# Patient Record
Sex: FEMALE | Race: WHITE | NOT HISPANIC OR LATINO
[De-identification: names, ages, dates, MRNs, and addresses within clinical notes are randomized per-mention and may not be internally consistent; named-entity substitution may affect disease eponyms.]

---

## 2023-05-16 PROBLEM — Z00.00 ENCOUNTER FOR PREVENTIVE HEALTH EXAMINATION: Status: ACTIVE | Noted: 2023-05-16

## 2023-11-27 ENCOUNTER — APPOINTMENT (OUTPATIENT)
Dept: ENDOCRINOLOGY | Facility: CLINIC | Age: 38
End: 2023-11-27

## 2024-01-03 ENCOUNTER — APPOINTMENT (OUTPATIENT)
Dept: BARIATRICS/WEIGHT MGMT | Facility: CLINIC | Age: 39
End: 2024-01-03
Payer: COMMERCIAL

## 2024-01-03 VITALS
HEIGHT: 67 IN | DIASTOLIC BLOOD PRESSURE: 80 MMHG | WEIGHT: 225 LBS | OXYGEN SATURATION: 98 % | HEART RATE: 82 BPM | BODY MASS INDEX: 35.31 KG/M2 | SYSTOLIC BLOOD PRESSURE: 130 MMHG

## 2024-01-03 DIAGNOSIS — Z82.49 FAMILY HISTORY OF ISCHEMIC HEART DISEASE AND OTHER DISEASES OF THE CIRCULATORY SYSTEM: ICD-10-CM

## 2024-01-03 DIAGNOSIS — Z82.3 FAMILY HISTORY OF STROKE: ICD-10-CM

## 2024-01-03 DIAGNOSIS — Z80.42 FAMILY HISTORY OF MALIGNANT NEOPLASM OF PROSTATE: ICD-10-CM

## 2024-01-03 PROCEDURE — 99204 OFFICE O/P NEW MOD 45 MIN: CPT

## 2024-01-03 RX ORDER — ATORVASTATIN CALCIUM 40 MG/1
40 TABLET, FILM COATED ORAL
Qty: 90 | Refills: 1 | Status: ACTIVE | COMMUNITY
Start: 2024-01-03

## 2024-01-03 RX ORDER — LOSARTAN POTASSIUM 25 MG/1
25 TABLET, FILM COATED ORAL DAILY
Qty: 1 | Refills: 2 | Status: ACTIVE | COMMUNITY
Start: 2024-01-03

## 2024-01-03 RX ORDER — CALCIUM CARBONATE 260MG(650)
100 TABLET,CHEWABLE ORAL
Refills: 0 | Status: ACTIVE | COMMUNITY
Start: 2024-01-03

## 2024-01-03 NOTE — HISTORY OF PRESENT ILLNESS
[FreeTextEntry1] : 38F PMH class II obesity, HTN, HLD, prediabetes, low HDL, anemia, who presents to weight management for initial evaluation.  Weight/Diet History: She has always struggled with her weight, was a 'chunky kid'. Lost some weight in college, and regained since. After pregnancy, the complex needs of her daughter (autism) started to gain weight as she wasn't taking care of herself. Her highest adult weight was 265 lbs (early 2023). She had been on Ozempic .25 mg/wk for 4 months, increased to 0.35 mg/wk, has lost 30-40 lbs over that time. Also walking more, weighing food, counting calories.  She has engaged in numerous weight loss interventions, including IF, aeroboic exercise, strength training, dietary tracking, without sustained effect.  Weight today: 225 lbs, BMI 35.24  Diet: Has been weighing and tracking everything, except the vegetables. B:  L: Turkey/chicken sandwich, with American cheese, spinach, red onion and greek-yogurt "chipotle wilkes". Side of popcorn, carrots, pickles, etc D (6:30-7): Hello Fresh - Usually chicken (1x/wk red meat), 700-900 kcal  Dessert: Snack: Sometimes a sweet or a fair life protein shake for sweet Beverages: 2x/month has 1-2 drinks. Occasional glass of milk.  Night-time eating: Sometimes on nights she has a deficit, but usually not. Stops eating by 8 whens he can Binging:  Fast-food/Restaurants: 1x/wk takeout Cook/Prepare meals: Bakes, sautee. Added oils: [employer]: yes, currently.  Exercise: [employer] spin class 3-4x/wk, and one 2-hr walk on treadmill per week while working.   Sleep: Goes to bed 10:30-11 pm, occasional snoring but gotten better, estimates 7-8 hours.   Social Hx: No tobacco use, occasional social alcohol. Has 6 year old daughter, w/craniosynostosis, autism.   Labs (10/27/23): CMP wnl, A1c 5.3%, Tchol 137, Trig 77, HDL 49, LDL 73, non-HDL 89, TSH/fT4 wnl,  CBC (4/3/23)

## 2024-01-03 NOTE — ASSESSMENT
[FreeTextEntry1] : 38F PMH class II obesity, HTN, HLD, prediabetes, low HDL, anemia, who presents to weight management for initial evaluation.  # Metabolic syndrome (class II obesity - central, HTN, prediabetes, low HDL) c/b HLD: Weight today 225 lbs, BMI 35.24, miranda has struggled with weight throughout her life. Particularly put on weight after pregnancy in the setting of taking care of the needs of her daughter. She took Ozempic 0.25 mg/wk for 4 months, increased to .35 mg/wk and has been doing very well, lost nearly 40 lbs, in conjunction with lifestyle changes including tracking her diet/calories, increasing exercise. Her diet is noted for 2 meals per day, tries not to eat late unless she has undereaten calorie goals that day. Doing [employer] several days per week. Sleep is good. Labs in October. Her goal is to eventually get off all medications.  - C/w tracking her diet - Meal planning/prep - Consider incorporating more plant-based proteins (ie legumes, unrefined whole grains) and keep vegetables, some fruit.  - C/w regular exercise - C/w current dosing of Ozempic, may go up to .5 mg/wk. Can consider looking into cost of Zepbound or alternatives with coupon, but for now will plan to stay with semaglutide.  - When dose increases, may prescribe higher dose pen - F/u in the next 3 months

## 2024-04-02 PROBLEM — E88.810 METABOLIC SYNDROME: Status: ACTIVE | Noted: 2024-01-03

## 2024-04-02 PROBLEM — E66.01 CLASS 2 SEVERE OBESITY WITH SERIOUS COMORBIDITY IN ADULT: Status: ACTIVE | Noted: 2024-01-03

## 2024-04-02 PROBLEM — R74.8 LOW SERUM HDL: Status: ACTIVE | Noted: 2024-01-03

## 2024-04-02 PROBLEM — I10 BENIGN ESSENTIAL HTN: Status: ACTIVE | Noted: 2024-01-03

## 2024-04-02 PROBLEM — E78.5 HLD (HYPERLIPIDEMIA): Status: ACTIVE | Noted: 2024-01-03

## 2024-04-03 ENCOUNTER — APPOINTMENT (OUTPATIENT)
Dept: BARIATRICS/WEIGHT MGMT | Facility: CLINIC | Age: 39
End: 2024-04-03
Payer: COMMERCIAL

## 2024-04-03 VITALS — HEIGHT: 67 IN | BODY MASS INDEX: 34.21 KG/M2 | WEIGHT: 218 LBS

## 2024-04-03 DIAGNOSIS — E66.01 MORBID (SEVERE) OBESITY DUE TO EXCESS CALORIES: ICD-10-CM

## 2024-04-03 DIAGNOSIS — E78.5 HYPERLIPIDEMIA, UNSPECIFIED: ICD-10-CM

## 2024-04-03 DIAGNOSIS — R74.8 ABNORMAL LEVELS OF OTHER SERUM ENZYMES: ICD-10-CM

## 2024-04-03 DIAGNOSIS — I10 ESSENTIAL (PRIMARY) HYPERTENSION: ICD-10-CM

## 2024-04-03 DIAGNOSIS — E88.810 METABOLIC SYNDROME: ICD-10-CM

## 2024-04-03 PROCEDURE — 99214 OFFICE O/P EST MOD 30 MIN: CPT

## 2024-04-03 PROCEDURE — G2211 COMPLEX E/M VISIT ADD ON: CPT | Mod: NC,1L

## 2024-04-03 RX ORDER — SEMAGLUTIDE 1.34 MG/ML
4 INJECTION, SOLUTION SUBCUTANEOUS
Qty: 1 | Refills: 0 | Status: ACTIVE | COMMUNITY
Start: 2024-01-03 | End: 1900-01-01

## 2024-04-04 NOTE — ASSESSMENT
[FreeTextEntry1] : 38F PMH class II obesity, HTN, HLD, prediabetes, low HDL, anemia, who presents to weight management for follow-up.  # Metabolic syndrome (class II obesity - central, HTN, prediabetes, low HDL) c/b HLD: Weight today 218 lbs, BMI 34.14, down 7 lbs since last visit 3 months ago, and >32 lbs since starting medication. She has being doing well on just a low dose of titrated ~0.4 mg/wk Ozempic. Has been consistent with lifestyle changes and exercise. Sleep is good. Labs in October. Her goal is to eventually get off all medications.  - C/w tracking her diet - Meal planning/prep - Consider incorporating more plant-based proteins (ie legumes, unrefined whole grains) and keep vegetables, some fruit.  - C/w regular exercise - May increase Ozempic to 0.5 mg/wk - Tx HTN, HLD, preDM, low HDL, and other comorbidities through weight loss, diet, exercise.  - F/u in the next 3 months

## 2024-04-04 NOTE — HISTORY OF PRESENT ILLNESS
[FreeTextEntry1] : 39F PMH class II obesity, HTN, HLD, prediabetes, low HDL, anemia, who presents to weight management for follow-up.  She initially presented 1/2024 reporting she has struggled with weight throughout her life. Particularly put on weight after pregnancy in the setting of taking care of the needs of her daughter. She took Ozempic 0.25 mg/wk for 4 months, increased to .35 mg/wk and had been doing very well, lost nearly 40 lbs, in conjunction with lifestyle changes including tracking her diet/calories, increasing exercise. Her diet was noted for 2 meals per day, tries not to eat late unless she had undereaten calorie goals that day. Was doing [employer] several days per week. Sleep was good. Labs in October. Her goal is to eventually get off all medications.  She was prescribed to increase semaglutide to 0.5 mg/wk.   Weight today: 218 lbs, BMI 34.14 Weight at Initial Visit (1/3/24): 225 lbs, BMI 35.24 Weight before starting medication: ~250 lbs  Medication: She has been on Ozempic ~0.4 mg/wk, no side effects.  Diet: Was tracking and weighing but fell off a bit, now back on. Keeping vegetables. Lean proteins. limiting sweets and caloric beverages.   Exercise: Has become even more consistent with exercise. Added more strength training. [employer] spin class 3-4x/wk, and one 2-hr walk on treadmill per week while working. Walking M-F.   Sleep: Goes to bed 10:30-11 pm, occasional snoring but gotten better, estimates 7-8 hours.   Labs (10/27/23): CMP wnl, A1c 5.3%, Tchol 137, Trig 77, HDL 49, LDL 73, non-HDL 89, TSH/fT4 wnl,  CBC (4/3/23)

## 2024-04-04 NOTE — PHYSICAL EXAM
[Obese, well nourished, in no acute distress] : obese, well nourished, in no acute distress [de-identified] : TEB visit

## 2024-07-18 ENCOUNTER — APPOINTMENT (OUTPATIENT)
Dept: BARIATRICS/WEIGHT MGMT | Facility: CLINIC | Age: 39
End: 2024-07-18
Payer: COMMERCIAL

## 2024-07-18 VITALS — BODY MASS INDEX: 34.06 KG/M2 | WEIGHT: 217 LBS | HEIGHT: 67 IN

## 2024-07-18 DIAGNOSIS — E78.5 HYPERLIPIDEMIA, UNSPECIFIED: ICD-10-CM

## 2024-07-18 DIAGNOSIS — E88.810 METABOLIC SYNDROME: ICD-10-CM

## 2024-07-18 DIAGNOSIS — E66.01 MORBID (SEVERE) OBESITY DUE TO EXCESS CALORIES: ICD-10-CM

## 2024-07-18 DIAGNOSIS — R74.8 ABNORMAL LEVELS OF OTHER SERUM ENZYMES: ICD-10-CM

## 2024-07-18 DIAGNOSIS — I10 ESSENTIAL (PRIMARY) HYPERTENSION: ICD-10-CM

## 2024-07-18 PROCEDURE — G2211 COMPLEX E/M VISIT ADD ON: CPT | Mod: NC

## 2024-07-18 PROCEDURE — 99214 OFFICE O/P EST MOD 30 MIN: CPT

## 2024-10-24 ENCOUNTER — APPOINTMENT (OUTPATIENT)
Dept: BARIATRICS/WEIGHT MGMT | Facility: CLINIC | Age: 39
End: 2024-10-24
Payer: COMMERCIAL

## 2024-10-24 VITALS — BODY MASS INDEX: 33.9 KG/M2 | HEIGHT: 67 IN | WEIGHT: 216 LBS

## 2024-10-24 DIAGNOSIS — E78.5 HYPERLIPIDEMIA, UNSPECIFIED: ICD-10-CM

## 2024-10-24 DIAGNOSIS — E88.810 METABOLIC SYNDROME: ICD-10-CM

## 2024-10-24 DIAGNOSIS — E66.812 OBESITY, CLASS 2: ICD-10-CM

## 2024-10-24 DIAGNOSIS — R74.8 ABNORMAL LEVELS OF OTHER SERUM ENZYMES: ICD-10-CM

## 2024-10-24 DIAGNOSIS — I10 ESSENTIAL (PRIMARY) HYPERTENSION: ICD-10-CM

## 2024-10-24 DIAGNOSIS — E66.01 OBESITY, CLASS 2: ICD-10-CM

## 2024-10-24 PROCEDURE — G2211 COMPLEX E/M VISIT ADD ON: CPT | Mod: NC

## 2024-10-24 PROCEDURE — 99214 OFFICE O/P EST MOD 30 MIN: CPT

## 2025-02-27 ENCOUNTER — APPOINTMENT (OUTPATIENT)
Dept: BARIATRICS/WEIGHT MGMT | Facility: CLINIC | Age: 40
End: 2025-02-27
Payer: COMMERCIAL

## 2025-02-27 VITALS — WEIGHT: 225 LBS | BODY MASS INDEX: 35.31 KG/M2 | HEIGHT: 67 IN

## 2025-02-27 DIAGNOSIS — E66.812 OBESITY, CLASS 2: ICD-10-CM

## 2025-02-27 DIAGNOSIS — E88.810 METABOLIC SYNDROME: ICD-10-CM

## 2025-02-27 DIAGNOSIS — I10 ESSENTIAL (PRIMARY) HYPERTENSION: ICD-10-CM

## 2025-02-27 DIAGNOSIS — E78.5 HYPERLIPIDEMIA, UNSPECIFIED: ICD-10-CM

## 2025-02-27 DIAGNOSIS — R74.8 ABNORMAL LEVELS OF OTHER SERUM ENZYMES: ICD-10-CM

## 2025-02-27 DIAGNOSIS — E66.01 OBESITY, CLASS 2: ICD-10-CM

## 2025-02-27 PROCEDURE — 99214 OFFICE O/P EST MOD 30 MIN: CPT | Mod: 95

## 2025-05-23 ENCOUNTER — APPOINTMENT (OUTPATIENT)
Dept: BARIATRICS/WEIGHT MGMT | Facility: CLINIC | Age: 40
End: 2025-05-23
Payer: COMMERCIAL

## 2025-05-23 VITALS — WEIGHT: 222 LBS | HEIGHT: 67 IN | BODY MASS INDEX: 34.84 KG/M2

## 2025-05-23 DIAGNOSIS — R74.8 ABNORMAL LEVELS OF OTHER SERUM ENZYMES: ICD-10-CM

## 2025-05-23 DIAGNOSIS — E66.01 OBESITY, CLASS 2: ICD-10-CM

## 2025-05-23 DIAGNOSIS — E88.810 METABOLIC SYNDROME: ICD-10-CM

## 2025-05-23 DIAGNOSIS — E66.812 OBESITY, CLASS 2: ICD-10-CM

## 2025-05-23 DIAGNOSIS — E78.5 HYPERLIPIDEMIA, UNSPECIFIED: ICD-10-CM

## 2025-05-23 DIAGNOSIS — I10 ESSENTIAL (PRIMARY) HYPERTENSION: ICD-10-CM

## 2025-05-23 PROCEDURE — 99214 OFFICE O/P EST MOD 30 MIN: CPT | Mod: 95

## 2025-05-23 PROCEDURE — G2211 COMPLEX E/M VISIT ADD ON: CPT | Mod: NC,95

## 2025-07-17 ENCOUNTER — APPOINTMENT (OUTPATIENT)
Dept: BARIATRICS/WEIGHT MGMT | Facility: CLINIC | Age: 40
End: 2025-07-17
Payer: COMMERCIAL

## 2025-07-17 VITALS — HEIGHT: 67 IN | BODY MASS INDEX: 35.47 KG/M2 | WEIGHT: 226 LBS

## 2025-07-17 PROCEDURE — G2211 COMPLEX E/M VISIT ADD ON: CPT | Mod: NC,95

## 2025-07-17 PROCEDURE — 99214 OFFICE O/P EST MOD 30 MIN: CPT | Mod: 95

## 2025-07-17 RX ORDER — SEMAGLUTIDE 1.7 MG/.75ML
1.7 INJECTION, SOLUTION SUBCUTANEOUS
Qty: 3 | Refills: 0 | Status: ACTIVE | COMMUNITY
Start: 2025-07-17 | End: 1900-01-01